# Patient Record
Sex: FEMALE | Race: WHITE | HISPANIC OR LATINO | Employment: UNEMPLOYED | URBAN - METROPOLITAN AREA
[De-identification: names, ages, dates, MRNs, and addresses within clinical notes are randomized per-mention and may not be internally consistent; named-entity substitution may affect disease eponyms.]

---

## 2021-12-18 ENCOUNTER — HOSPITAL ENCOUNTER (EMERGENCY)
Facility: MEDICAL CENTER | Age: 26
End: 2021-12-18
Attending: EMERGENCY MEDICINE

## 2021-12-18 ENCOUNTER — APPOINTMENT (OUTPATIENT)
Dept: RADIOLOGY | Facility: MEDICAL CENTER | Age: 26
End: 2021-12-18
Attending: EMERGENCY MEDICINE

## 2021-12-18 VITALS
DIASTOLIC BLOOD PRESSURE: 69 MMHG | TEMPERATURE: 98.5 F | WEIGHT: 136.91 LBS | HEIGHT: 68 IN | OXYGEN SATURATION: 100 % | SYSTOLIC BLOOD PRESSURE: 108 MMHG | HEART RATE: 82 BPM | BODY MASS INDEX: 20.75 KG/M2 | RESPIRATION RATE: 16 BRPM

## 2021-12-18 DIAGNOSIS — O20.9 VAGINAL BLEEDING IN PREGNANCY, FIRST TRIMESTER: ICD-10-CM

## 2021-12-18 LAB
ACTION RH IMMUNE GLOB 8505RHG: NORMAL
ALBUMIN SERPL BCP-MCNC: 4.3 G/DL (ref 3.2–4.9)
ALBUMIN/GLOB SERPL: 1.4 G/DL
ALP SERPL-CCNC: 54 U/L (ref 30–99)
ALT SERPL-CCNC: 43 U/L (ref 2–50)
ANION GAP SERPL CALC-SCNC: 9 MMOL/L (ref 7–16)
AST SERPL-CCNC: 35 U/L (ref 12–45)
B-HCG SERPL-ACNC: 106 MIU/ML (ref 0–5)
BASOPHILS # BLD AUTO: 0.3 % (ref 0–1.8)
BASOPHILS # BLD: 0.03 K/UL (ref 0–0.12)
BILIRUB SERPL-MCNC: 0.3 MG/DL (ref 0.1–1.5)
BUN SERPL-MCNC: 11 MG/DL (ref 8–22)
CALCIUM SERPL-MCNC: 9.3 MG/DL (ref 8.5–10.5)
CHLORIDE SERPL-SCNC: 103 MMOL/L (ref 96–112)
CO2 SERPL-SCNC: 26 MMOL/L (ref 20–33)
CREAT SERPL-MCNC: 0.67 MG/DL (ref 0.5–1.4)
EOSINOPHIL # BLD AUTO: 0.24 K/UL (ref 0–0.51)
EOSINOPHIL NFR BLD: 2.4 % (ref 0–6.9)
ERYTHROCYTE [DISTWIDTH] IN BLOOD BY AUTOMATED COUNT: 38.5 FL (ref 35.9–50)
GLOBULIN SER CALC-MCNC: 3.1 G/DL (ref 1.9–3.5)
GLUCOSE SERPL-MCNC: 90 MG/DL (ref 65–99)
HCT VFR BLD AUTO: 42.6 % (ref 37–47)
HGB BLD-MCNC: 14.3 G/DL (ref 12–16)
IMM GRANULOCYTES # BLD AUTO: 0.02 K/UL (ref 0–0.11)
IMM GRANULOCYTES NFR BLD AUTO: 0.2 % (ref 0–0.9)
LYMPHOCYTES # BLD AUTO: 2.46 K/UL (ref 1–4.8)
LYMPHOCYTES NFR BLD: 24.6 % (ref 22–41)
MCH RBC QN AUTO: 29.4 PG (ref 27–33)
MCHC RBC AUTO-ENTMCNC: 33.6 G/DL (ref 33.6–35)
MCV RBC AUTO: 87.5 FL (ref 81.4–97.8)
MONOCYTES # BLD AUTO: 0.64 K/UL (ref 0–0.85)
MONOCYTES NFR BLD AUTO: 6.4 % (ref 0–13.4)
NEUTROPHILS # BLD AUTO: 6.62 K/UL (ref 2–7.15)
NEUTROPHILS NFR BLD: 66.1 % (ref 44–72)
NRBC # BLD AUTO: 0 K/UL
NRBC BLD-RTO: 0 /100 WBC
NUMBER OF RH DOSES IND 8505RD: 1
PLATELET # BLD AUTO: 231 K/UL (ref 164–446)
PMV BLD AUTO: 10 FL (ref 9–12.9)
POTASSIUM SERPL-SCNC: 3.9 MMOL/L (ref 3.6–5.5)
PROT SERPL-MCNC: 7.4 G/DL (ref 6–8.2)
RBC # BLD AUTO: 4.87 M/UL (ref 4.2–5.4)
RH BLD: NORMAL
SODIUM SERPL-SCNC: 138 MMOL/L (ref 135–145)
WBC # BLD AUTO: 10 K/UL (ref 4.8–10.8)

## 2021-12-18 PROCEDURE — 85025 COMPLETE CBC W/AUTO DIFF WBC: CPT

## 2021-12-18 PROCEDURE — 86901 BLOOD TYPING SEROLOGIC RH(D): CPT

## 2021-12-18 PROCEDURE — 76801 OB US < 14 WKS SINGLE FETUS: CPT

## 2021-12-18 PROCEDURE — 96374 THER/PROPH/DIAG INJ IV PUSH: CPT

## 2021-12-18 PROCEDURE — 80053 COMPREHEN METABOLIC PANEL: CPT

## 2021-12-18 PROCEDURE — 99284 EMERGENCY DEPT VISIT MOD MDM: CPT

## 2021-12-18 PROCEDURE — 84702 CHORIONIC GONADOTROPIN TEST: CPT

## 2021-12-18 NOTE — ED TRIAGE NOTES
"Chief Complaint   Patient presents with   • Vaginal Bleeding     Pt states she is approx 3 weeks pregnant and began to experienc pelvic and back pain with bleeding approx 5 days ago. Pt states her LMP was november 5th. She has had 3 posiive pregnany tests but no US confirming intrauterine pregnancy.       Ambulatory to triage for above complaint.   Educated on triage process, encourage to inform staff of any changes.     /70   Pulse 79   Temp 36.9 °C (98.4 °F) (Temporal)   Resp 14   Ht 1.72 m (5' 7.72\")   Wt 62.1 kg (136 lb 14.5 oz)   SpO2 100%   BMI 20.99 kg/m²     "

## 2021-12-19 NOTE — ED NOTES
Pt reports soaking a pad an hour yesterday.  Pt changed into gown.  Pt has not provided urine sample, will call when she can.      All other labs sent while waiting in triage.

## 2021-12-19 NOTE — ED NOTES
Discharge paperwork reviewed with patient and questions answered. Pt discharged to care of self. All belongings with patient.

## 2021-12-19 NOTE — DISCHARGE INSTRUCTIONS
Return to the emergency department on Monday for repeat blood work for evaluation of your beta hCG and further evaluation to rule out ectopic pregnancy.  You need to return the emergency department sooner if you have increasing pain, heavy vaginal bleeding greater than a pad an hour, lightheadedness or passout.  No sex, tampon use, heavy exercise or heavy lifting until cleared by gynecology.

## 2021-12-19 NOTE — ED PROVIDER NOTES
ED Provider Note    Scribed for Papo Henriquez M.D. by Sharon Johnson. 2021, 5:31 PM.    Primary care provider: No primary care provider noted.  Means of arrival: Walk In  History obtained from: Patient  History limited by: None     CHIEF COMPLAINT  Chief Complaint   Patient presents with    Vaginal Bleeding     Pt states she is approx 3 weeks pregnant and began to experienc pelvic and back pain with bleeding approx 5 days ago. Pt states her LMP was . She has had 3 posiive pregnany tests but no US confirming intrauterine pregnancy.       HPI  Miracle Sigala is a  26 y.o. female who presents to the Emergency Department for evaluation of vaginal bleeding onset five days ago. The patient's last menstrual period was on 2021 and she is 6 weeks and 2 days pregnant by LMP. She had her first child via caesarean section. The patient took four at-home pregnancy tests, which all came back positive but she has not had an ultrasound to confirm the pregnancy. She describes the bleeding as spotting initially, but is now more heavy and like a period. She also has pelvic pain and low back pain that she describes as mild and like a cramp. She explains that on the first day of bleeding, it was similar to spotting, but it is now bleeding more like a period. She experiences associated dysuria and nausea. She denies associated fever, chills, or vomiting. She has no other major medical problems and is not allergic to any medications.     REVIEW OF SYSTEMS  Pertinent positives include vaginal bleeding, pelvic pain, low back pain, dysuria, and nausea. Pertinent negatives include  fever, chills, or vomiting. All other systems negative.    PAST MEDICAL HISTORY   None noted.     SURGICAL HISTORY  Caesarean section    SOCIAL HISTORY  Social History     Tobacco Use    Smoking status: None noted    Smokeless tobacco: None noted   Substance Use Topics    Alcohol use: None noted    Drug use: None noted     "  Social History     Substance and Sexual Activity   Drug Use None noted       FAMILY HISTORY  No family history noted.    CURRENT MEDICATIONS  No current outpatient medications     ALLERGIES  No Known Allergies    PHYSICAL EXAM  VITAL SIGNS: /70   Pulse 79   Temp 36.9 °C (98.4 °F) (Temporal)   Resp 14   Ht 1.72 m (5' 7.72\")   Wt 62.1 kg (136 lb 14.5 oz)   SpO2 100%   BMI 20.99 kg/m²     Constitutional: Well developed, Well nourished, mild distress.   HENT: Normocephalic, Atraumatic, mask in place.  Eyes: Conjunctiva normal, No discharge.   Cardiovascular: Normal heart rate, Normal rhythm, No murmurs, equal pulses.   Pulmonary: Normal breath sounds, No respiratory distress, No wheezing, No rales, No rhonchi.  Abdomen: Tender suprapubically. Soft, No tenderness, No masses, no rebound, no guarding.   Back: No CVA tenderness.   Musculoskeletal: No major deformities noted, No tenderness.   Extremities: no edema   Skin: Warm, Dry, No erythema, No rash.   Neurologic: Alert & oriented x 3, Normal motor function,  No focal deficits noted.   Psychiatric: Affect normal, Judgment normal, Mood normal.   Pelvic exam: Normal external female genitalia, Dark blood in vaginal wall coming from the cervical os, Cervical os closed, No adnexal tenderness or masses.    LABS  Results for orders placed or performed during the hospital encounter of 12/18/21   CBC WITH DIFFERENTIAL   Result Value Ref Range    WBC 10.0 4.8 - 10.8 K/uL    RBC 4.87 4.20 - 5.40 M/uL    Hemoglobin 14.3 12.0 - 16.0 g/dL    Hematocrit 42.6 37.0 - 47.0 %    MCV 87.5 81.4 - 97.8 fL    MCH 29.4 27.0 - 33.0 pg    MCHC 33.6 33.6 - 35.0 g/dL    RDW 38.5 35.9 - 50.0 fL    Platelet Count 231 164 - 446 K/uL    MPV 10.0 9.0 - 12.9 fL    Neutrophils-Polys 66.10 44.00 - 72.00 %    Lymphocytes 24.60 22.00 - 41.00 %    Monocytes 6.40 0.00 - 13.40 %    Eosinophils 2.40 0.00 - 6.90 %    Basophils 0.30 0.00 - 1.80 %    Immature Granulocytes 0.20 0.00 - 0.90 %    " Nucleated RBC 0.00 /100 WBC    Neutrophils (Absolute) 6.62 2.00 - 7.15 K/uL    Lymphs (Absolute) 2.46 1.00 - 4.80 K/uL    Monos (Absolute) 0.64 0.00 - 0.85 K/uL    Eos (Absolute) 0.24 0.00 - 0.51 K/uL    Baso (Absolute) 0.03 0.00 - 0.12 K/uL    Immature Granulocytes (abs) 0.02 0.00 - 0.11 K/uL    NRBC (Absolute) 0.00 K/uL   COMP METABOLIC PANEL   Result Value Ref Range    Sodium 138 135 - 145 mmol/L    Potassium 3.9 3.6 - 5.5 mmol/L    Chloride 103 96 - 112 mmol/L    Co2 26 20 - 33 mmol/L    Anion Gap 9.0 7.0 - 16.0    Glucose 90 65 - 99 mg/dL    Bun 11 8 - 22 mg/dL    Creatinine 0.67 0.50 - 1.40 mg/dL    Calcium 9.3 8.5 - 10.5 mg/dL    AST(SGOT) 35 12 - 45 U/L    ALT(SGPT) 43 2 - 50 U/L    Alkaline Phosphatase 54 30 - 99 U/L    Total Bilirubin 0.3 0.1 - 1.5 mg/dL    Albumin 4.3 3.2 - 4.9 g/dL    Total Protein 7.4 6.0 - 8.2 g/dL    Globulin 3.1 1.9 - 3.5 g/dL    A-G Ratio 1.4 g/dL   RH TYPE FOR RHOGAM FROM E.D.   Result Value Ref Range    Emergency Department Rh Typing NEG     Number Of Rh Doses Indicated 1    HCG QUANTITATIVE   Result Value Ref Range    Bhcg 106.0 (H) 0.0 - 5.0 mIU/mL   ESTIMATED GFR   Result Value Ref Range    GFR If African American >60 >60 mL/min/1.73 m 2    GFR If Non African American >60 >60 mL/min/1.73 m 2   ACTION: RH IMMUNE GLOBULIN   Result Value Ref Range    Action  Rh Immune Globulin [id number]       issued       1 Syrng       All labs reviewed by me.    RADIOLOGY  US-OB 1ST TRIMESTER WITH TRANSVAGINAL (COMBO)   Final Result      1.  There is no IUP.   2.  There are findings in the left adnexa highly suspicious for ectopic pregnancy.      3.  Findings were discussed with USHA COLLINS on 12/18/2021 at 7:16 PM.        The radiologist's interpretation of all radiological studies have been reviewed by me.    COURSE & MEDICAL DECISION MAKING  Pertinent Labs & Imaging studies reviewed. (See chart for details)    5:31 PM - Patient seen and examined at bedside. Discussed plan of care  with patient, including imaging and labs. I also explained to the patient that her lab results show that her pregnancy hormones are low and that we will perform a pelvic exam to evaluate her bleeding and pregnancy. Patient verbalizes understanding and support with the plan of care. Ordered US-OB, UA, HCG Dallas, RH Type, CMP, CBC w/ Diff, and Estimated GFR to evaluate her symptoms. The differential diagnoses include but are not limited to: ectopic, miscarriage, and early pregnancy.     7:19 PM - Paged Gynecology.      7:24 PM - I discussed the patient's case and the above findings with Dr. Pacheco (Gynecology) who recommends that the patient returns to the ER on Monday for repeat HCG Dallas. If it has gone up and she is still in pain, then she may be a candidate for Methotrexate. The patient should also return sooner is she has increase pain, lightheadedness, or heavy bleeding. Patient will also be treated with RhoGAM.    7:40 PM - Pelvic exam performed at this time with a female chaperone present. Findings noted in physical exam section. An  was used to explain to the patient that she will be treated with RhoGAM since she is Rh negative. I also had a long discussion with her about the chance of ectopic pregnancy, but since her pregnancy hormones are so low, we are not certain. I advised her to come back in 48 hours for a repeat HCG Dallas and to reevaluate for an ectopic pregnancy. Patient had the opportunity to ask any questions. The plan for discharge was discussed with them and she was told to return for any new or worsening symptoms. She was also informed of the plans for follow up. Patient is understanding and agreeable to the plan for discharge.     Medical Decision Making: At this point time patient may have a ectopic pregnancy but vital signs are stable her quant is low therefore we think this needs to be repeated to see if it doubles.  She is aware of the risks of an ectopic pregnancy and  has been given strict return guidelines.  I think she is stable to be discharged she understands that she is to return in 2 days for repeat quant and reevaluation to see if this appears like an ectopic versus early IUP.    The patient will return for new or worsening symptoms and is stable at the time of discharge.    The patient is referred to a primary physician for blood pressure management, diabetic screening, and for all other preventative health concerns.    DISPOSITION:  Patient will be discharged home in stable condition.    FOLLOW UP:  [facility], Emergency Dept  [address] [facility] [address] [phone]  Go in 2 days  Return in 48 hours for recheck of your HCG, and revaluation for an eptopic pregnancy    Brandy Lal M.D.  [address] [place] [id number] [phone]    Schedule an appointment as soon as possible for a visit     FINAL IMPRESSION  1. Vaginal bleeding in pregnancy, first trimester          Sharon ROBLES (S[place]), am scribing for, and in the presence of, Papo Henriquez M.D.    Electronically signed by: Sharon Johnson ([place]), 12/18/2021    IPapo M.D. personally performed the services described in this documentation, as scribed by Sharon Johnson in my presence, and it is both accurate and complete. C.    The note accurately reflects work and decisions made by me.  Papo Henriquez M.D.  12/19/2021  1:27 AM

## 2021-12-19 NOTE — ED NOTES
Blood bank called for release of RhoGAM injection. Informed medication is being prepared and sent.

## 2021-12-20 ENCOUNTER — HOSPITAL ENCOUNTER (EMERGENCY)
Facility: MEDICAL CENTER | Age: 26
End: 2021-12-20
Attending: EMERGENCY MEDICINE

## 2021-12-20 VITALS
HEART RATE: 68 BPM | BODY MASS INDEX: 20.38 KG/M2 | OXYGEN SATURATION: 96 % | WEIGHT: 134.48 LBS | HEIGHT: 68 IN | RESPIRATION RATE: 18 BRPM | DIASTOLIC BLOOD PRESSURE: 64 MMHG | SYSTOLIC BLOOD PRESSURE: 103 MMHG | TEMPERATURE: 97.6 F

## 2021-12-20 DIAGNOSIS — O03.9 MISCARRIAGE: ICD-10-CM

## 2021-12-20 LAB — B-HCG SERPL-ACNC: 66.9 MIU/ML (ref 0–5)

## 2021-12-20 PROCEDURE — 36415 COLL VENOUS BLD VENIPUNCTURE: CPT

## 2021-12-20 PROCEDURE — 99284 EMERGENCY DEPT VISIT MOD MDM: CPT

## 2021-12-20 PROCEDURE — 84702 CHORIONIC GONADOTROPIN TEST: CPT

## 2021-12-20 ASSESSMENT — FIBROSIS 4 INDEX: FIB4 SCORE: 0.55

## 2021-12-21 NOTE — ED TRIAGE NOTES
Pt arrives to ED triage with c/o of follow up for vaginal bleeding during pregnancy. She was seen in ED two days ago and told to return for repeat HCG and repeat US. She had no intrauterine pregnancy on US. LMP Nov 5 2021. She is [place] only and interpretor ipad was used to complete triage.     Pt educated on ED process and asked to wait in lobby. Patient educated on importance of alerting staff to new or worsening symptoms or concerns.

## 2021-12-21 NOTE — ED PROVIDER NOTES
ED Provider Note    CHIEF COMPLAINT  Chief Complaint   Patient presents with   • Ectopic Pregnancy     possible    • Other       HPI  Miracle Sigala is a 26 y.o. female who presents here for follow-up after being evaluated on the  for vaginal bleeding in pregnancy.  In reviewing the chart the ultrasound states that there is no IUP and there are findings in the left adnexa highly suspicious for ectopic pregnancy.    Beta hCG was 106.  It appears that she is Rh- and received RhoGam.    Appears that she was sent here for repeat blood testing.      He is note that the  system was used.  The patient gave that history.  She is  2 para 1 1  the child 5 years old no history of gynecological surgeries or any vaginal infections.        REVIEW OF SYSTEMS  See HPI for further details. All other systems are negative.      PAST MEDICAL HISTORY  No past medical history on file.    FAMILY HISTORY  No family history on file.    SOCIAL HISTORY  Social History     Socioeconomic History   • Marital status:      Spouse name: Not on file   • Number of children: Not on file   • Years of education: Not on file   • Highest education level: Not on file   Occupational History   • Not on file   Tobacco Use   • Smoking status: Not on file   • Smokeless tobacco: Never Used   Substance and Sexual Activity   • Alcohol use: Not Currently   • Drug use: Never   • Sexual activity: Not on file   Other Topics Concern   • Not on file   Social History Narrative   • Not on file     Social Determinants of Health     Financial Resource Strain:    • Difficulty of Paying Living Expenses: Not on file   Food Insecurity:    • Worried About Running Out of Food in the Last Year: Not on file   • Ran Out of Food in the Last Year: Not on file   Transportation Needs:    • Lack of Transportation (Medical): Not on file   • Lack of Transportation (Non-Medical): Not on file   Physical Activity:    • Days of Exercise per  "Week: Not on file   • Minutes of Exercise per Session: Not on file   Stress:    • Feeling of Stress : Not on file   Social Connections:    • Frequency of Communication with Friends and Family: Not on file   • Frequency of Social Gatherings with Friends and Family: Not on file   • Attends Sabianist Services: Not on file   • Active Member of Clubs or Organizations: Not on file   • Attends Club or Organization Meetings: Not on file   • Marital Status: Not on file   Intimate Partner Violence:    • Fear of Current or Ex-Partner: Not on file   • Emotionally Abused: Not on file   • Physically Abused: Not on file   • Sexually Abused: Not on file   Housing Stability:    • Unable to Pay for Housing in the Last Year: Not on file   • Number of Places Lived in the Last Year: Not on file   • Unstable Housing in the Last Year: Not on file       SURGICAL HISTORY  No past surgical history on file.    CURRENT MEDICATIONS  Home Medications     Reviewed by Letha Mancini R.N. (Registered Nurse) on 12/20/21 at 1733  Med List Status: Complete   Medication Last Dose Status        Patient Kleber Taking any Medications                       ALLERGIES  No Known Allergies    PHYSICAL EXAM  VITAL SIGNS: /64   Pulse 76   Temp 37.5 °C (99.5 °F) (Temporal)   Resp 16   Ht 1.727 m (5' 8\")   Wt 61 kg (134 lb 7.7 oz)   LMP 11/05/2021   SpO2 97%   BMI 20.45 kg/m²       Constitutional: Well developed, Well nourished, No acute distress, Non-toxic appearance.   HENT: Normocephalic, Atraumatic,   Lymphatic: No lymphadenopathy noted.   Cardiovascular: No tachycardia noted.  Thorax & Lungs: No respiratory distress no tachypnea.   Abdomen: Nondistended nontender soft  Skin: Warm, Dry, No erythema, No rash.   Back: No tenderness, No CVA tenderness.     RADIOLOGY/PROCEDURES  Results for orders placed or performed during the hospital encounter of 12/20/21   BETA-HCG QUANTITATIVE SERUM   Result Value Ref Range    Bhcg 66.9 (H) 0.0 - 5.0 mIU/mL "        COURSE & MEDICAL DECISION MAKING  Pertinent Labs & Imaging studies reviewed. (See chart for details)  The patient was noted to have a beta-hCG is dropping.  Her abdominal exam is unremarkable    We reviewed the ultrasound.  There is an ultrasound mass.  At this point my suspicion for ectopic is low but percent.  Therefore I have informed her all of this is mostly 7 a miscarriage.  She needs to come back if she has increasing pain or bleeding.  In addition, my recommendation help with her gynecologist.  Patient return to the ER if she has worsening symptoms    Is a female who is unfortunately had a miscarriage.  Cannot completely rule out ectopic.  However there is no indication for gynecological consultation at this time she is stable she has no belly pain her bleeding is stopped her quant is dropping.    FINAL IMPRESSION  1.  Miscarriage  2.   3.      Electronically signed by: Jaya Lizarraga M.D., 12/20/2021 7:30 PM

## 2022-01-25 ENCOUNTER — HOSPITAL ENCOUNTER (EMERGENCY)
Facility: MEDICAL CENTER | Age: 27
End: 2022-01-25
Attending: EMERGENCY MEDICINE

## 2022-01-25 ENCOUNTER — APPOINTMENT (OUTPATIENT)
Dept: RADIOLOGY | Facility: MEDICAL CENTER | Age: 27
End: 2022-01-25
Attending: EMERGENCY MEDICINE

## 2022-01-25 VITALS
HEIGHT: 67 IN | BODY MASS INDEX: 24.22 KG/M2 | RESPIRATION RATE: 16 BRPM | SYSTOLIC BLOOD PRESSURE: 105 MMHG | OXYGEN SATURATION: 96 % | WEIGHT: 154.32 LBS | TEMPERATURE: 98 F | HEART RATE: 87 BPM | DIASTOLIC BLOOD PRESSURE: 64 MMHG

## 2022-01-25 DIAGNOSIS — N83.201 CYST OF RIGHT OVARY: ICD-10-CM

## 2022-01-25 DIAGNOSIS — R19.7 DIARRHEA, UNSPECIFIED TYPE: ICD-10-CM

## 2022-01-25 DIAGNOSIS — E86.0 DEHYDRATION: ICD-10-CM

## 2022-01-25 DIAGNOSIS — R10.30 LOWER ABDOMINAL PAIN: ICD-10-CM

## 2022-01-25 DIAGNOSIS — R55 SYNCOPE, UNSPECIFIED SYNCOPE TYPE: ICD-10-CM

## 2022-01-25 LAB
ALBUMIN SERPL BCP-MCNC: 4 G/DL (ref 3.2–4.9)
ALBUMIN/GLOB SERPL: 1.3 G/DL
ALP SERPL-CCNC: 50 U/L (ref 30–99)
ALT SERPL-CCNC: 24 U/L (ref 2–50)
ANION GAP SERPL CALC-SCNC: 9 MMOL/L (ref 7–16)
APPEARANCE UR: CLEAR
AST SERPL-CCNC: 24 U/L (ref 12–45)
B-HCG SERPL-ACNC: <1 MIU/ML (ref 0–5)
BACTERIA #/AREA URNS HPF: NEGATIVE /HPF
BASOPHILS # BLD AUTO: 0.2 % (ref 0–1.8)
BASOPHILS # BLD: 0.03 K/UL (ref 0–0.12)
BILIRUB SERPL-MCNC: 0.7 MG/DL (ref 0.1–1.5)
BILIRUB UR QL STRIP.AUTO: NEGATIVE
BUN SERPL-MCNC: 12 MG/DL (ref 8–22)
C TRACH DNA SPEC QL NAA+PROBE: NEGATIVE
CALCIUM SERPL-MCNC: 8.7 MG/DL (ref 8.5–10.5)
CANDIDA DNA VAG QL PROBE+SIG AMP: NEGATIVE
CHLORIDE SERPL-SCNC: 106 MMOL/L (ref 96–112)
CO2 SERPL-SCNC: 21 MMOL/L (ref 20–33)
COLOR UR: YELLOW
CREAT SERPL-MCNC: 0.57 MG/DL (ref 0.5–1.4)
EKG IMPRESSION: NORMAL
EOSINOPHIL # BLD AUTO: 0.19 K/UL (ref 0–0.51)
EOSINOPHIL NFR BLD: 1.2 % (ref 0–6.9)
EPI CELLS #/AREA URNS HPF: ABNORMAL /HPF
ERYTHROCYTE [DISTWIDTH] IN BLOOD BY AUTOMATED COUNT: 40 FL (ref 35.9–50)
G VAGINALIS DNA VAG QL PROBE+SIG AMP: NEGATIVE
GLOBULIN SER CALC-MCNC: 3 G/DL (ref 1.9–3.5)
GLUCOSE SERPL-MCNC: 88 MG/DL (ref 65–99)
GLUCOSE UR STRIP.AUTO-MCNC: NEGATIVE MG/DL
HCG SERPL QL: NEGATIVE
HCT VFR BLD AUTO: 41 % (ref 37–47)
HGB BLD-MCNC: 13.8 G/DL (ref 12–16)
HYALINE CASTS #/AREA URNS LPF: ABNORMAL /LPF
IMM GRANULOCYTES # BLD AUTO: 0.07 K/UL (ref 0–0.11)
IMM GRANULOCYTES NFR BLD AUTO: 0.5 % (ref 0–0.9)
KETONES UR STRIP.AUTO-MCNC: 40 MG/DL
LEUKOCYTE ESTERASE UR QL STRIP.AUTO: ABNORMAL
LIPASE SERPL-CCNC: 17 U/L (ref 11–82)
LYMPHOCYTES # BLD AUTO: 1.63 K/UL (ref 1–4.8)
LYMPHOCYTES NFR BLD: 10.7 % (ref 22–41)
MCH RBC QN AUTO: 29.2 PG (ref 27–33)
MCHC RBC AUTO-ENTMCNC: 33.7 G/DL (ref 33.6–35)
MCV RBC AUTO: 86.9 FL (ref 81.4–97.8)
MICRO URNS: ABNORMAL
MONOCYTES # BLD AUTO: 0.68 K/UL (ref 0–0.85)
MONOCYTES NFR BLD AUTO: 4.4 % (ref 0–13.4)
N GONORRHOEA DNA SPEC QL NAA+PROBE: NEGATIVE
NEUTROPHILS # BLD AUTO: 12.7 K/UL (ref 2–7.15)
NEUTROPHILS NFR BLD: 83 % (ref 44–72)
NITRITE UR QL STRIP.AUTO: NEGATIVE
NRBC # BLD AUTO: 0 K/UL
NRBC BLD-RTO: 0 /100 WBC
PH UR STRIP.AUTO: 5 [PH] (ref 5–8)
PLATELET # BLD AUTO: 192 K/UL (ref 164–446)
PMV BLD AUTO: 10.4 FL (ref 9–12.9)
POTASSIUM SERPL-SCNC: 3.7 MMOL/L (ref 3.6–5.5)
PROT SERPL-MCNC: 7 G/DL (ref 6–8.2)
PROT UR QL STRIP: NEGATIVE MG/DL
RBC # BLD AUTO: 4.72 M/UL (ref 4.2–5.4)
RBC # URNS HPF: ABNORMAL /HPF
RBC UR QL AUTO: NEGATIVE
SODIUM SERPL-SCNC: 136 MMOL/L (ref 135–145)
SP GR UR STRIP.AUTO: 1.01
SPECIMEN SOURCE: NORMAL
T VAGINALIS DNA VAG QL PROBE+SIG AMP: NEGATIVE
TROPONIN T SERPL-MCNC: <6 NG/L (ref 6–19)
UROBILINOGEN UR STRIP.AUTO-MCNC: 0.2 MG/DL
WBC # BLD AUTO: 15.3 K/UL (ref 4.8–10.8)
WBC #/AREA URNS HPF: ABNORMAL /HPF

## 2022-01-25 PROCEDURE — 84702 CHORIONIC GONADOTROPIN TEST: CPT

## 2022-01-25 PROCEDURE — 87660 TRICHOMONAS VAGIN DIR PROBE: CPT

## 2022-01-25 PROCEDURE — 84484 ASSAY OF TROPONIN QUANT: CPT

## 2022-01-25 PROCEDURE — 85025 COMPLETE CBC W/AUTO DIFF WBC: CPT

## 2022-01-25 PROCEDURE — 700105 HCHG RX REV CODE 258: Performed by: EMERGENCY MEDICINE

## 2022-01-25 PROCEDURE — 81001 URINALYSIS AUTO W/SCOPE: CPT

## 2022-01-25 PROCEDURE — 87510 GARDNER VAG DNA DIR PROBE: CPT

## 2022-01-25 PROCEDURE — 87480 CANDIDA DNA DIR PROBE: CPT

## 2022-01-25 PROCEDURE — 83690 ASSAY OF LIPASE: CPT

## 2022-01-25 PROCEDURE — 87591 N.GONORRHOEAE DNA AMP PROB: CPT

## 2022-01-25 PROCEDURE — 87491 CHLMYD TRACH DNA AMP PROBE: CPT

## 2022-01-25 PROCEDURE — 76856 US EXAM PELVIC COMPLETE: CPT

## 2022-01-25 PROCEDURE — 80053 COMPREHEN METABOLIC PANEL: CPT

## 2022-01-25 PROCEDURE — 84703 CHORIONIC GONADOTROPIN ASSAY: CPT

## 2022-01-25 PROCEDURE — 87086 URINE CULTURE/COLONY COUNT: CPT

## 2022-01-25 PROCEDURE — 74177 CT ABD & PELVIS W/CONTRAST: CPT

## 2022-01-25 PROCEDURE — 70450 CT HEAD/BRAIN W/O DYE: CPT

## 2022-01-25 PROCEDURE — 99285 EMERGENCY DEPT VISIT HI MDM: CPT

## 2022-01-25 PROCEDURE — 93005 ELECTROCARDIOGRAM TRACING: CPT | Performed by: EMERGENCY MEDICINE

## 2022-01-25 PROCEDURE — 93005 ELECTROCARDIOGRAM TRACING: CPT

## 2022-01-25 PROCEDURE — 700117 HCHG RX CONTRAST REV CODE 255: Performed by: EMERGENCY MEDICINE

## 2022-01-25 RX ORDER — SODIUM CHLORIDE, SODIUM LACTATE, POTASSIUM CHLORIDE, CALCIUM CHLORIDE 600; 310; 30; 20 MG/100ML; MG/100ML; MG/100ML; MG/100ML
1000 INJECTION, SOLUTION INTRAVENOUS ONCE
Status: COMPLETED | OUTPATIENT
Start: 2022-01-25 | End: 2022-01-25

## 2022-01-25 RX ADMIN — IOHEXOL 100 ML: 350 INJECTION, SOLUTION INTRAVENOUS at 13:09

## 2022-01-25 RX ADMIN — SODIUM CHLORIDE, POTASSIUM CHLORIDE, SODIUM LACTATE AND CALCIUM CHLORIDE 1000 ML: 600; 310; 30; 20 INJECTION, SOLUTION INTRAVENOUS at 11:30

## 2022-01-25 ASSESSMENT — FIBROSIS 4 INDEX: FIB4 SCORE: 0.58

## 2022-01-25 NOTE — ED TRIAGE NOTES
"Chief Complaint   Patient presents with   • Syncope     Passed out while at the [facility] today. C/o head and neck pain. C-collar in place.    • Abdominal Pain     Pt c/o lower abd pain. Diagnosed with miscarriage/possible ectopic in December. A2     /69   Pulse 81   Temp 36.6 °C (97.9 °F) (Temporal)   Resp 20   Ht 1.702 m (5' 7\")   Wt 70 kg (154 lb 5.2 oz)   LMP 2021   SpO2 97%   BMI 24.17 kg/m²     Pt brought in by CHRISTIAN from [facility] to [address], mask in place. Pt in a gown and on monitor. Chart flagged for ERP to see.    PIV established,  pta.   "

## 2022-01-25 NOTE — ED NOTES
Pt and family member at bedside verbalize understanding of discharge instructions and follow up/prescription . Pt able to ambulate out to ED lobby with all belongings.

## 2022-01-25 NOTE — ED PROVIDER NOTES
ED Provider Note    ER PROVIDER NOTE        CHIEF COMPLAINT  Chief Complaint   Patient presents with   • Syncope     Passed out while at the [facility] today. C/o head and neck pain. C-collar in place.    • Abdominal Pain     Pt c/o lower abd pain. Diagnosed with miscarriage/possible ectopic in December. A2       HPI  Miracle Sigala is a 27 y.o. female who presents to the emergency department with abdominal pain and an episode of syncope.  Patient reports she has had lower abdominal pain over the last 2 days, as well as some diarrhea.  The pain seems to be somewhat all over but more lower and is crampy in nature.  No real alleviating or aggravating factors, no nausea, vomiting.  No fevers or chills.  No dysuria, vaginal bleeding or discharge.  She was being seen at [facility] for this, when she was standing and feeling lightheaded and passed out.  She did hit her head and is now reporting headache as well.  No neck pain, no focal weakness numbness or tingling.  She states she is not pregnant although did have a miscarriage a month and a half ago.  No chest pain, cough, congestion or shortness of breath.  No palpitations.     REVIEW OF SYSTEMS  Pertinent positives include syncope, abdominal pain. Pertinent negatives include no pregnancy. See HPI for details. All other systems reviewed and are negative.    PAST MEDICAL HISTORY       SURGICAL HISTORY  patient denies any surgical history    FAMILY HISTORY  History reviewed. No pertinent family history.    SOCIAL HISTORY  Social History     Socioeconomic History   • Marital status:      Spouse name: Not on file   • Number of children: Not on file   • Years of education: Not on file   • Highest education level: Not on file   Occupational History   • Not on file   Tobacco Use   • Smoking status: Never Smoker   • Smokeless tobacco: Never Used   Vaping Use   • Vaping Use: Never used   Substance and Sexual Activity   • Alcohol use: Not  "Currently   • Drug use: Never   • Sexual activity: Not on file   Other Topics Concern   • Not on file   Social History Narrative   • Not on file     Social Determinants of Health     Financial Resource Strain:    • Difficulty of Paying Living Expenses: Not on file   Food Insecurity:    • Worried About Running Out of Food in the Last Year: Not on file   • Ran Out of Food in the Last Year: Not on file   Transportation Needs:    • Lack of Transportation (Medical): Not on file   • Lack of Transportation (Non-Medical): Not on file   Physical Activity:    • Days of Exercise per Week: Not on file   • Minutes of Exercise per Session: Not on file   Stress:    • Feeling of Stress : Not on file   Social Connections:    • Frequency of Communication with Friends and Family: Not on file   • Frequency of Social Gatherings with Friends and Family: Not on file   • Attends Christian Services: Not on file   • Active Member of Clubs or Organizations: Not on file   • Attends Club or Organization Meetings: Not on file   • Marital Status: Not on file   Intimate Partner Violence:    • Fear of Current or Ex-Partner: Not on file   • Emotionally Abused: Not on file   • Physically Abused: Not on file   • Sexually Abused: Not on file   Housing Stability:    • Unable to Pay for Housing in the Last Year: Not on file   • Number of Places Lived in the Last Year: Not on file   • Unstable Housing in the Last Year: Not on file      Social History     Substance and Sexual Activity   Drug Use Never       CURRENT MEDICATIONS  Home Medications     Reviewed by Lorenza Ghosh R.N. (Registered Nurse) on 01/25/22 at 1034  Med List Status: Not Addressed   Medication Last Dose Status        Patient Kleber Taking any Medications                       ALLERGIES  No Known Allergies    PHYSICAL EXAM  VITAL SIGNS: /64   Pulse 87   Temp 36.7 °C (98 °F) (Temporal)   Resp 16   Ht 1.702 m (5' 7\")   Wt 70 kg (154 lb 5.2 oz)   LMP 11/05/2021   SpO2 96%   " BMI 24.17 kg/m²   Pulse ox interpretation: I interpret this pulse ox as normal.    Constitutional: Alert in no apparent distress.  HENT: No signs of trauma, Bilateral external ears normal, Nose normal.   Eyes: Pupils are equal and reactive, Conjunctiva normal, Non-icteric.   Neck: C-spine is nontender in the midline and there are no deformities or step-offs, normal range of motion,  Supple, No stridor.   Cardiovascular: Regular rate and rhythm, no murmurs.   Thorax & Lungs: Normal breath sounds, No respiratory distress, No wheezing, No chest tenderness.   Abdomen: Bowel sounds normal, Soft, mild suprapubic tenderness, No masses, No pulsatile masses. No peritoneal signs.  Skin: Warm, Dry, No erythema, No rash.   Back: No bony tenderness, No CVA tenderness.   Extremities: Intact distal pulses, No edema, No tenderness, No cyanosis, Negative Mariano's sign.  Musculoskeletal: Good range of motion in all major joints. No tenderness to palpation or major deformities noted.   Neurologic: Alert , Normal motor function, Normal sensory function, No focal deficits noted.   Psychiatric: Affect normal, Judgment normal, Mood normal.     : Pelvic exam is performed with nurse chaperone.  Normal external female genitalia without rash or lesion, there is a small amount of white discharge in the vaginal vault, no CMT or adnexal mass or tenderness    DIAGNOSTIC STUDIES / PROCEDURES    Results for orders placed or performed during the hospital encounter of 01/25/22   CBC WITH DIFFERENTIAL   Result Value Ref Range    WBC 15.3 (H) 4.8 - 10.8 K/uL    RBC 4.72 4.20 - 5.40 M/uL    Hemoglobin 13.8 12.0 - 16.0 g/dL    Hematocrit 41.0 37.0 - 47.0 %    MCV 86.9 81.4 - 97.8 fL    MCH 29.2 27.0 - 33.0 pg    MCHC 33.7 33.6 - 35.0 g/dL    RDW 40.0 35.9 - 50.0 fL    Platelet Count 192 164 - 446 K/uL    MPV 10.4 9.0 - 12.9 fL    Neutrophils-Polys 83.00 (H) 44.00 - 72.00 %    Lymphocytes 10.70 (L) 22.00 - 41.00 %    Monocytes 4.40 0.00 - 13.40 %     Eosinophils 1.20 0.00 - 6.90 %    Basophils 0.20 0.00 - 1.80 %    Immature Granulocytes 0.50 0.00 - 0.90 %    Nucleated RBC 0.00 /100 WBC    Neutrophils (Absolute) 12.70 (H) 2.00 - 7.15 K/uL    Lymphs (Absolute) 1.63 1.00 - 4.80 K/uL    Monos (Absolute) 0.68 0.00 - 0.85 K/uL    Eos (Absolute) 0.19 0.00 - 0.51 K/uL    Baso (Absolute) 0.03 0.00 - 0.12 K/uL    Immature Granulocytes (abs) 0.07 0.00 - 0.11 K/uL    NRBC (Absolute) 0.00 K/uL   COMP METABOLIC PANEL   Result Value Ref Range    Sodium 136 135 - 145 mmol/L    Potassium 3.7 3.6 - 5.5 mmol/L    Chloride 106 96 - 112 mmol/L    Co2 21 20 - 33 mmol/L    Anion Gap 9.0 7.0 - 16.0    Glucose 88 65 - 99 mg/dL    Bun 12 8 - 22 mg/dL    Creatinine 0.57 0.50 - 1.40 mg/dL    Calcium 8.7 8.5 - 10.5 mg/dL    AST(SGOT) 24 12 - 45 U/L    ALT(SGPT) 24 2 - 50 U/L    Alkaline Phosphatase 50 30 - 99 U/L    Total Bilirubin 0.7 0.1 - 1.5 mg/dL    Albumin 4.0 3.2 - 4.9 g/dL    Total Protein 7.0 6.0 - 8.2 g/dL    Globulin 3.0 1.9 - 3.5 g/dL    A-G Ratio 1.3 g/dL   LIPASE   Result Value Ref Range    Lipase 17 11 - 82 U/L   HCG QUAL SERUM   Result Value Ref Range    Beta-Hcg Qualitative Serum Negative Negative   URINALYSIS,CULTURE IF INDICATED    Specimen: Urine, Clean Catch   Result Value Ref Range    Color Yellow     Character Clear     Specific Gravity 1.015 <1.035    Ph 5.0 5.0 - 8.0    Glucose Negative Negative mg/dL    Ketones 40 (A) Negative mg/dL    Protein Negative Negative mg/dL    Bilirubin Negative Negative    Urobilinogen, Urine 0.2 Negative    Nitrite Negative Negative    Leukocyte Esterase Trace (A) Negative    Occult Blood Negative Negative    Micro Urine Req Microscopic    TROPONIN   Result Value Ref Range    Troponin T <6 6 - 19 ng/L   ESTIMATED GFR   Result Value Ref Range    GFR If African American >60 >60 mL/min/1.73 m 2    GFR If Non African American >60 >60 mL/min/1.73 m 2   URINE MICROSCOPIC (W/UA)   Result Value Ref Range    WBC 10-20 (A) /hpf    RBC 0-2 /hpf     Bacteria Negative None /hpf    Epithelial Cells Few /hpf    Hyaline Cast 0-2 /lpf   CHLAMYDIA & GC BY PCR    Specimen: Genital   Result Value Ref Range    Source Vaginal    EKG   Result Value Ref Range    Report       [facility] Emergency Dept.    Test Date:  2022  Pt Name:    RADHA OLIVER           Department: ER  MRN:        [id number]                      Room:       [facility]  Gender:     Female                       Technician: [id number]  :        1995                   Requested By:ER TRIAGE PROTOCOL  Order #:    [id number]                    Reading MD: PERNELL HULL MD    Measurements  Intervals                                Axis  Rate:       80                           P:          33  CT:         116                          QRS:        25  QRSD:       76                           T:          32  QT:         372  QTc:        430    Interpretive Statements  SINUS RHYTHM  No previous ECG available for comparison  Electronically Signed On 2022 14:59:51 PST by PERNELL HULL MD           RADIOLOGY  US-PELVIC COMPLETE (TRANSABDOMINAL/TRANSVAGINAL) (COMBO)   Final Result      1.  RIGHT ovarian cyst versus dominant follicle.   2.  No evidence for ovarian torsion.   3.  Minimal fluid in the cervical canal.   4.  No evidence to suggest PID.      CT-ABDOMEN-PELVIS WITH   Final Result      1.  Edematous appearing cervix and LEFT ovary/adnexa concerning for inflammation.   2.  RIGHT ovarian follicle.   3.  No bowel obstruction or evidence for perforation.   4.  No evidence for appendicitis.         CT-HEAD W/O   Final Result      No acute intracranial abnormality.                    The radiologist's interpretation of all radiological studies have been reviewed and images independently viewed by me.    COURSE & MEDICAL DECISION MAKING  Nursing notes, VS, PMSFHx reviewed in chart.    10:56 AM Patient seen and examined at bedside. Patient will be treated with Toradol and IV fluid.  Ordered for CBC, CMP, lipase, quant, CT head as well as abdomen pelvis, ECG and troponin to evaluate her symptoms.     1:37 PM  Patient is reevaluated, reports pain is essentially gone.  Based on results thus far, pending ultrasound    3:12 PM  Patient is reevaluated, she is still comfortable and feeling improved.  Abdomen is soft and nontender.  Updated on all results and plan for discharge to which she is agreeable    HYDRATION: Based on the patient's presentation of Acute Diarrhea the patient was given IV fluids. IV Hydration was used because oral hydration was not as rapid as required. Upon recheck following hydration, the patient was improved.     Video  used throughout patient visit    Decision Making:  This is a 27 y.o. female presenting for an episode of syncope or being seen for abdominal pain in primary care office.  Regarding the patient's syncope I think this is likely a combination of some dehydration as well as vasovagal with her pain. There was no witnessed seizure activity or history or residual neuro deficit to suggest seizure. The patient has no valvular abnormality on my examination to suggest valvular cause or hypertrophic cardiomyopathy. The ekg does not show any evidence of arrythmia, prolonged intervals, early excitation, or other abnormality such as an accessory pathway, qt prolongation, or brugada syndrome. ACS or MI are unlikely causes given nature of sx, unremarkable ECG and given the patients improvement the likely of acs of mi is very low. Other cardiac causes are also unlikely based on my history and physical examination.  Serious bacterial illness was considered but ruled out by history and physical exam.  There is no evidence of metabolic abnormalities to explain this episode of syncope on labs.    Regarding the patient's abdominal pain, this may be some from her diarrhea although she does have an ovarian cyst on the right.  CT shows no evidence of surgical intra-abdominal  pathology such as appendicitis perforation or obstruction and her symptoms have fully improved and she has a benign abdominal exam at the time of discharge.  There was a question of gynecologic pathology on her CT so ultrasound was ordered as well she does have a small ovarian cyst but no evidence of torsion PID or TOA.  Pelvic exam had small amount of discharge although I would not empirically treat her based on this.  Urinalysis is negative.  She is not pregnant       The patient will return for new or worsening symptoms and is stable at the time of discharge.    The patient is referred to a primary physician for blood pressure management, diabetic screening, and for all other preventative health concerns.        DISPOSITION:  Patient will be discharged home in stable condition.    FOLLOW UP:  [facility] ([facility]) - Primary Care  [address] [place] [id number] [phone]  Schedule an appointment as soon as possible for a visit         OUTPATIENT MEDICATIONS:  New Prescriptions    No medications on file         FINAL IMPRESSION  1. Lower abdominal pain    2. Dehydration    3. Diarrhea, unspecified type    4. Syncope, unspecified syncope type    5. Cyst of right ovary          The note accurately reflects work and decisions made by me.  Mahad Rock M.D.  1/25/2022  3:17 PM

## 2022-01-27 LAB
BACTERIA UR CULT: NORMAL
SIGNIFICANT IND 70042: NORMAL
SITE SITE: NORMAL
SOURCE SOURCE: NORMAL